# Patient Record
Sex: FEMALE | Race: WHITE | URBAN - METROPOLITAN AREA
[De-identification: names, ages, dates, MRNs, and addresses within clinical notes are randomized per-mention and may not be internally consistent; named-entity substitution may affect disease eponyms.]

---

## 2020-05-15 ENCOUNTER — HISTORICAL (OUTPATIENT)
Dept: ADMINISTRATIVE | Facility: HOSPITAL | Age: 45
End: 2020-05-15

## 2022-04-10 ENCOUNTER — HISTORICAL (OUTPATIENT)
Dept: ADMINISTRATIVE | Facility: HOSPITAL | Age: 47
End: 2022-04-10

## 2022-04-26 VITALS
WEIGHT: 206.38 LBS | HEIGHT: 60 IN | BODY MASS INDEX: 40.52 KG/M2 | SYSTOLIC BLOOD PRESSURE: 128 MMHG | DIASTOLIC BLOOD PRESSURE: 90 MMHG

## 2022-05-03 NOTE — HISTORICAL [facility] CERNER
This is a historical note converted from [employer]. Formatting and pictures may have been removed.  Please reference [employer] for original formatting and attached multimedia. Chief Complaint  left knee pain  History of Present Illness  45-year-old female here?with left knee pain for the last?month. ?It started the day after she jumped on the trampoline with her grandchildren.? At the time she did not have pain but when she woke up in the morning noticed her knee was stiff and painful. ?She is tried multiple?over-the-counter ointments?and muscle rubs along with over-the-counter NSAIDs. ?None of this is giving her any relief. ?She denies any previous trauma to the knee.? The knee?hurts a little bit to walk on.? It hurts even more if she gets into certain positions or twisting of the knee. ?Sometimes feels like it is going to give out.  Review of Systems  Constitutional: no fever, fatigue, weakness  Eye: no vision loss, eye redness, drainage, or pain  ENMT: no sore throat, ear pain, sinus pain/congestion, nasal congestion/drainage  Respiratory: no cough, no wheezing, no shortness of breath  Cardiovascular: no chest pain, no palpitations, no edema  Gastrointestinal: no nausea, vomiting, or diarrhea. No abdominal pain  Genitourinary: no dysuria, no urinary frequency or urgency, no hematuria  Hema/Lymph: no abnormal bruising or bleeding  Endocrine: no heat or cold intolerance, no excessive thirst or excessive urination  Musculoskeletal: See HPI  Integumentary: no skin rash or abnormal lesion  Neurologic: no headache, no dizziness, no weakness or numbness  ?  Physical Exam  Vitals & Measurements  T:?36.8? ?C (Oral)? HR:?84(Peripheral)? BP:?128/90?  HT:?153.5?cm? WT:?93.6?kg? BMI:?39.72?  Exam of the left knee shows limited range of motion with?ability to only flex to about 90?degrees. ?She has tenderness along the medial joint line.? She has a positive McMurrays?sign.? Laxity feels normal.  Assessment/Plan  1.?Left knee pain ?  M25.562  ?X-ray of the?knee does not show any severe degenerative changes. ?She has good?spacing.? We will go ahead and try a?knee injection today with 80 mg of Depo-Medrol and 3 cc of lidocaine under sterile conditions to see if this gives her some relief.? If this does not help the pain?I would recommend getting an MRI to look at?possible meniscal damage.  2.?OA (osteoarthritis) of knee ? M17.10  Orders:  methylPREDNISolone, 80 mg, Intra-Articular, Once-Unscheduled, first dose 05/15/20 12:05:00 CDT  asp/inj jnt/bursa, major  PC, 05/15/20 12:06:00 CDT[facility], 05/15/20 12:06:00 CDT, Left knee pain  OA (osteoarthritis) of knee  Clinic Follow-up PRN, 05/15/20 12:07:00 CDT, [facility] Future Order  Office/Outpatient Visit Level 3 Established 47361-20 , Left knee pain  OA (osteoarthritis) of knee, [facility], 05/15/20 12:07:00 CDT  She will call back in a week if she does not have significant relief  Referrals  Clinic Follow-up PRN, 05/15/20 12:07:00 CDT, [facility], Future Order   Problem List/Past Medical History  Ongoing  Left knee pain  OA (osteoarthritis) of knee  Obesity  Historical  No qualifying data  Procedure/Surgical History  Abdominal hysterectomy  Appendectomy   section  Cholecystectomy   Medications  cyclobenzaprine 10 mg oral tablet, 10 mg= 1 tab(s), Oral, TID  Depo-Medrol 40 mg/mL injectable suspension, 80 mg, Intra-Articular, Once-Unscheduled  Allergies  No Known Allergies  Social History  Abuse/Neglect  No, 05/15/2020  Tobacco  Never (less than 100 in lifetime), N/A, 05/15/2020  Family History  Family history is negative  Health Maintenance  Health Maintenance  ???Pending?(in the next year)  ??? ??OverDue  ??? ? ? ?Alcohol Misuse Screening due??20??and every 1??year(s)  ??? ??Due?  ??? ? ? ?ADL Screening due??05/15/20??and every 1??year(s)  ??? ? ? ?Cervical Cancer Screening due??05/15/20??and every?  ??? ? ?  ?Depression Screening due??05/15/20??and every?  ??? ? ? ?Diabetes Screening due??05/15/20??and every?  ??? ? ? ?Tetanus Vaccine due??05/15/20??and every 10??year(s)  ??? ??Due In Future?  ??? ? ? ?Obesity Screening not due until??01/01/21??and every 1??year(s)  ???Satisfied?(in the past 1 year)  ??? ??Satisfied?  ??? ? ? ?Blood Pressure Screening on??05/15/20.??Satisfied by Veronica Lemus  ??? ? ? ?Body Mass Index Check on??05/15/20.??Satisfied by Veronica Lemus  ??? ? ? ?Obesity Screening on??05/15/20.??Satisfied by Veronica Lemus  ?

## 2024-10-03 ENCOUNTER — CLINICAL SUPPORT (OUTPATIENT)
Dept: FAMILY MEDICINE | Facility: CLINIC | Age: 49
End: 2024-10-03
Payer: COMMERCIAL

## 2024-10-03 DIAGNOSIS — Z23 ENCOUNTER FOR IMMUNIZATION: Primary | ICD-10-CM

## 2024-10-03 PROCEDURE — 90656 IIV3 VACC NO PRSV 0.5 ML IM: CPT | Mod: ,,, | Performed by: FAMILY MEDICINE

## 2024-10-03 PROCEDURE — 90471 IMMUNIZATION ADMIN: CPT | Mod: ,,, | Performed by: FAMILY MEDICINE
